# Patient Record
Sex: FEMALE | Race: WHITE | NOT HISPANIC OR LATINO | URBAN - METROPOLITAN AREA
[De-identification: names, ages, dates, MRNs, and addresses within clinical notes are randomized per-mention and may not be internally consistent; named-entity substitution may affect disease eponyms.]

---

## 2018-07-23 ENCOUNTER — AMBULATORY - HEALTHEAST (OUTPATIENT)
Dept: GERIATRICS | Facility: CLINIC | Age: 68
End: 2018-07-23

## 2018-07-23 ENCOUNTER — OFFICE VISIT - HEALTHEAST (OUTPATIENT)
Dept: GERIATRICS | Facility: CLINIC | Age: 68
End: 2018-07-23

## 2018-07-23 DIAGNOSIS — I73.9 PERIPHERAL VASCULAR DISEASE (H): ICD-10-CM

## 2018-07-23 DIAGNOSIS — F32.A DEPRESSION: ICD-10-CM

## 2018-07-23 DIAGNOSIS — E11.49 TYPE 2 DIABETES MELLITUS WITH NEUROLOGIC COMPLICATION (H): ICD-10-CM

## 2018-07-23 DIAGNOSIS — I89.0 LYMPHEDEMA: ICD-10-CM

## 2018-07-23 DIAGNOSIS — F43.10 POSTTRAUMATIC STRESS DISORDER: ICD-10-CM

## 2018-07-23 DIAGNOSIS — S98.221D: ICD-10-CM

## 2018-07-23 DIAGNOSIS — F17.200 TOBACCO DEPENDENCE: ICD-10-CM

## 2018-07-23 DIAGNOSIS — E66.01 MORBID OBESITY (H): ICD-10-CM

## 2018-07-23 DIAGNOSIS — Z98.84 HISTORY OF ROUX-EN-Y GASTRIC BYPASS: ICD-10-CM

## 2018-07-23 RX ORDER — OXYCODONE HYDROCHLORIDE 5 MG/1
5 TABLET ORAL EVERY 4 HOURS PRN
Status: SHIPPED | COMMUNITY
Start: 2018-07-23

## 2018-07-23 ASSESSMENT — MIFFLIN-ST. JEOR: SCORE: 1694.36

## 2018-07-24 ENCOUNTER — AMBULATORY - HEALTHEAST (OUTPATIENT)
Dept: ADMINISTRATIVE | Facility: CLINIC | Age: 68
End: 2018-07-24

## 2018-07-24 ENCOUNTER — RECORDS - HEALTHEAST (OUTPATIENT)
Dept: LAB | Facility: CLINIC | Age: 68
End: 2018-07-24

## 2018-07-24 ENCOUNTER — OFFICE VISIT - HEALTHEAST (OUTPATIENT)
Dept: GERIATRICS | Facility: CLINIC | Age: 68
End: 2018-07-24

## 2018-07-24 DIAGNOSIS — S98.131A AMPUTATED TOE, RIGHT (H): ICD-10-CM

## 2018-07-24 DIAGNOSIS — I10 HYPERTENSION: ICD-10-CM

## 2018-07-24 DIAGNOSIS — I82.409 DVT (DEEP VENOUS THROMBOSIS) (H): ICD-10-CM

## 2018-07-24 DIAGNOSIS — R53.81 PHYSICAL DEBILITY: ICD-10-CM

## 2018-07-24 LAB
BASOPHILS # BLD AUTO: 0 THOU/UL (ref 0–0.2)
BASOPHILS NFR BLD AUTO: 0 % (ref 0–2)
EOSINOPHIL # BLD AUTO: 0.2 THOU/UL (ref 0–0.4)
EOSINOPHIL NFR BLD AUTO: 2 % (ref 0–6)
ERYTHROCYTE [DISTWIDTH] IN BLOOD BY AUTOMATED COUNT: 14.1 % (ref 11–14.5)
HCT VFR BLD AUTO: 37.4 % (ref 35–47)
HGB BLD-MCNC: 11.9 G/DL (ref 12–16)
LYMPHOCYTES # BLD AUTO: 2.9 THOU/UL (ref 0.8–4.4)
LYMPHOCYTES NFR BLD AUTO: 26 % (ref 20–40)
MCH RBC QN AUTO: 31.2 PG (ref 27–34)
MCHC RBC AUTO-ENTMCNC: 31.8 G/DL (ref 32–36)
MCV RBC AUTO: 98 FL (ref 80–100)
MONOCYTES # BLD AUTO: 0.9 THOU/UL (ref 0–0.9)
MONOCYTES NFR BLD AUTO: 8 % (ref 2–10)
NEUTROPHILS # BLD AUTO: 6.8 THOU/UL (ref 2–7.7)
NEUTROPHILS NFR BLD AUTO: 63 % (ref 50–70)
PLATELET # BLD AUTO: 305 THOU/UL (ref 140–440)
PMV BLD AUTO: 10.8 FL (ref 8.5–12.5)
RBC # BLD AUTO: 3.81 MILL/UL (ref 3.8–5.4)
WBC: 10.9 THOU/UL (ref 4–11)

## 2018-07-24 RX ORDER — CLOPIDOGREL BISULFATE 75 MG/1
75 TABLET ORAL DAILY
Status: SHIPPED | COMMUNITY
Start: 2018-07-24

## 2018-07-24 RX ORDER — GABAPENTIN 300 MG/1
300 CAPSULE ORAL
Status: SHIPPED | COMMUNITY
Start: 2018-07-24

## 2018-07-24 RX ORDER — INSULIN GLARGINE 100 [IU]/ML
50 INJECTION, SOLUTION SUBCUTANEOUS AT BEDTIME
Status: SHIPPED | COMMUNITY
Start: 2018-07-24

## 2018-07-24 RX ORDER — GABAPENTIN 800 MG/1
800 TABLET ORAL 2 TIMES DAILY
Status: SHIPPED | COMMUNITY
Start: 2018-07-24

## 2018-07-24 RX ORDER — NYSTATIN 100000/G
1 POWDER (GRAM) TOPICAL 2 TIMES DAILY
Status: SHIPPED | COMMUNITY
Start: 2018-07-24

## 2018-07-24 RX ORDER — TIZANIDINE 2 MG/1
2-4 TABLET ORAL DAILY PRN
Status: SHIPPED | COMMUNITY
Start: 2018-07-24

## 2018-07-27 ENCOUNTER — OFFICE VISIT - HEALTHEAST (OUTPATIENT)
Dept: GERIATRICS | Facility: CLINIC | Age: 68
End: 2018-07-27

## 2018-07-27 DIAGNOSIS — S98.131A AMPUTATED TOE, RIGHT (H): ICD-10-CM

## 2018-07-27 DIAGNOSIS — E11.9 DIABETES MELLITUS (H): ICD-10-CM

## 2018-07-27 DIAGNOSIS — I10 HYPERTENSION: ICD-10-CM

## 2018-07-27 DIAGNOSIS — R53.81 PHYSICAL DEBILITY: ICD-10-CM

## 2018-07-31 ENCOUNTER — OFFICE VISIT - HEALTHEAST (OUTPATIENT)
Dept: GERIATRICS | Facility: CLINIC | Age: 68
End: 2018-07-31

## 2018-07-31 DIAGNOSIS — I73.9 PVD (PERIPHERAL VASCULAR DISEASE) (H): ICD-10-CM

## 2018-07-31 DIAGNOSIS — I82.409 DVT (DEEP VENOUS THROMBOSIS) (H): ICD-10-CM

## 2018-07-31 DIAGNOSIS — S98.131A AMPUTATED TOE, RIGHT (H): ICD-10-CM

## 2018-07-31 DIAGNOSIS — I10 HYPERTENSION: ICD-10-CM

## 2018-08-03 ENCOUNTER — OFFICE VISIT - HEALTHEAST (OUTPATIENT)
Dept: GERIATRICS | Facility: CLINIC | Age: 68
End: 2018-08-03

## 2018-08-03 DIAGNOSIS — E11.9 DIABETES MELLITUS (H): ICD-10-CM

## 2018-08-03 DIAGNOSIS — I73.9 PVD (PERIPHERAL VASCULAR DISEASE) (H): ICD-10-CM

## 2018-08-03 DIAGNOSIS — I82.409 DVT (DEEP VENOUS THROMBOSIS) (H): ICD-10-CM

## 2018-08-03 DIAGNOSIS — S98.131A AMPUTATED TOE, RIGHT (H): ICD-10-CM

## 2018-08-07 ENCOUNTER — OFFICE VISIT - HEALTHEAST (OUTPATIENT)
Dept: GERIATRICS | Facility: CLINIC | Age: 68
End: 2018-08-07

## 2018-08-07 DIAGNOSIS — I73.9 PVD (PERIPHERAL VASCULAR DISEASE) (H): ICD-10-CM

## 2018-08-07 DIAGNOSIS — S98.131A AMPUTATED TOE, RIGHT (H): ICD-10-CM

## 2018-08-07 DIAGNOSIS — I10 HYPERTENSION: ICD-10-CM

## 2018-08-07 DIAGNOSIS — E11.9 DIABETES MELLITUS (H): ICD-10-CM

## 2018-08-08 ENCOUNTER — AMBULATORY - HEALTHEAST (OUTPATIENT)
Dept: GERIATRICS | Facility: CLINIC | Age: 68
End: 2018-08-08

## 2021-06-01 VITALS — HEIGHT: 60 IN | WEIGHT: 274 LBS | BODY MASS INDEX: 53.79 KG/M2

## 2021-06-16 PROBLEM — I73.9 PVD (PERIPHERAL VASCULAR DISEASE) (H): Status: ACTIVE | Noted: 2018-07-31

## 2021-06-16 PROBLEM — E11.9 DIABETES MELLITUS (H): Status: ACTIVE | Noted: 2018-07-27

## 2021-06-16 PROBLEM — S98.131A: Status: ACTIVE | Noted: 2018-07-24

## 2021-06-16 PROBLEM — I82.409 DVT (DEEP VENOUS THROMBOSIS) (H): Status: ACTIVE | Noted: 2018-07-24

## 2021-06-16 PROBLEM — I10 HYPERTENSION: Status: ACTIVE | Noted: 2018-07-24
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
## 2021-06-19 NOTE — PROGRESS NOTES
[facility]    Facility:   [facility] [[id number]]   Code Status: FULL CODE  PCP: Gretchen Burgess MD   Phone: [phone]   Fax: [phone]      CHIEF COMPLAINT/REASON FOR VISIT:  Chief Complaint   Patient presents with     Discharge Summary       HISTORY COURSE:  Deana Frazier is a 67 y.o. female with a history of multiple risks factors for severe ischemic peripheral vascular disease including diabetes morbid obesity long-standing tobacco habituation.  The patient had an angioplasty in 2017 and an amputation of her fifth right toe.  On July 5, 2018 she had an ultrasound-guided selective right lower extremity angiogram and a superselective right lower extremity angiogram with atherectomy of the mid and distal right superficial femoral artery and placement of a drug-coated balloon angioplasty of the distal and mid superficial femoral artery.  Prior to that she had had a number of weeks of treatment for lymphedema and had developed some ulcers of her toes.  Unfortunately 2 days prior to presenting in the hospital she developed blisters and a gray appearance of her third and fourth toes.  They also began weeping.  Arterial Dopplers done in the emergency department these showed severe deterioration of her ankle-brachial indexes in moderate to severe ischemic disease.  She was then started on clindamycin in the emergency room and both vascular surgery and podiatry were involved.     Hospital course the patient did end up getting surgical amputation of her third and fourth digits on July 19 with podiatry.   She has been able to participate with the rehabilitation services.  She does have a cam boot.  She does have a recheck visit with vascular today.  Her last visit was last week checking her wound they did remove the sutures there is a gapping at the incision around the third toe area and there putting a alginate gauze in that area and changing every 3 days.  Her pain  seems to be pretty well managed currently being treated with gabapentin but no as needed also has oxycodone as needed usually 2 or 3 doses per day and occasionally does take a tight tizanidine.  Her blood sugars pretty much less than 180.  She has been normotensive and afebrile and also on room air.  Review of Systems  Currently she denies chills and fever coughing wheezing chest pain dizziness or vertigo nausea vomiting diarrhea dysuria headache stiff neck swollen glands.  History of colon cancer anemia amputation right foot toes 3 through 5 angioplasty with stenting hypertension diabetes hyperlipidemia neuropathy GERD and depression.  Vitals:    08/07/18 0833   BP: 115/72   Pulse: 72   Resp: 18   Temp: 97.1  F (36.2  C)   SpO2: 95%       Physical Exam     Constitutional: She is oriented to person, place, and time. She appears well-nourished. No distress.   HENT:    Cardiovascular: Normal rate, regular rhythm and normal heart sounds.    Chronic lymphedema.  Recent right lower extremity acute DVT   Pulmonary/Chest: Breath sounds normal.    Musculoskeletal:   Generalized debility and deconditioning.  Chronic pain.  Recent amputation right foot toes 3 through 5 .  Sutures in place.  No secondary infection.  Lymphadenopathy:     She has no cervical adenopathy.   Neurological: She is oriented to person, place, and time.   Skin: Right foot plantar third toe alginate wick  Psychiatric: Her behavior is normal.      Lab Results   Component Value Date    WBC 10.9 07/24/2018    HGB 11.9 (L) 07/24/2018    HCT 37.4 07/24/2018    MCV 98 07/24/2018     07/24/2018     Results for orders placed or performed during the hospital encounter of 10/20/14   Basic Metabolic Panel   Result Value Ref Range    Sodium 139 136 - 145 mmol/L    Potassium 3.9 3.5 - 5.0 mmol/L    Chloride 101 98 - 107 mmol/L    CO2 27 22 - 31 mmol/L    Anion Gap, Calculation 11 5 - 18 mmol/L    Glucose 148 (H) 70 - 125 mg/dL    Calcium 9.6 8.5 - 10.5 mg/dL     BUN 8 8 - 22 mg/dL    Creatinine 0.80 0.60 - 1.10 mg/dL    GFR MDRD Af Amer >60 >60 mL/min/1.73m2    GFR MDRD Non Af Amer >60 >60 mL/min/1.73m2     No results found for: HGBA1C  MEDICATION LIST:  Current Outpatient Prescriptions   Medication Sig     aspirin 325 MG EC tablet Take 325 mg by mouth daily.     atenolol (TENORMIN) 50 MG tablet Take 100 mg by mouth daily.      atorvastatin (LIPITOR) 80 MG tablet Take 80 mg by mouth at bedtime.      clopidogrel (PLAVIX) 75 mg tablet Take 75 mg by mouth daily.     fluocinonide (LIDEX) 0.05 % cream Apply 1 application topically 2 (two) times a day as needed.      gabapentin (NEURONTIN) 300 MG capsule Take 300 mg by mouth at bedtime as needed.     gabapentin (NEURONTIN) 800 MG tablet Take 800 mg by mouth 2 (two) times a day.     insulin aspart U-100 (NOVOLOG) 100 unit/mL injection Inject 10 Units under the skin 3 (three) times a day with meals. And per sliding scale three times a day with meals; And per sliding scale at bedtime     insulin glargine (LANTUS) 100 unit/mL injection Inject 50 Units under the skin at bedtime.     lisinopril (PRINIVIL,ZESTRIL) 20 MG tablet Take 20 mg by mouth daily.     metFORMIN (GLUCOPHAGE) 1000 MG tablet Take 1,000 mg by mouth 2 (two) times a day with meals.     multivitamin (MULTIPLE VITAMIN ORAL) Take 1 tablet by mouth daily.     nystatin (MYCOSTATIN) powder Apply 1 application topically 2 (two) times a day.     omeprazole (PRILOSEC) 20 MG capsule Take 20 mg by mouth daily.     oxyCODONE (ROXICODONE) 5 MG immediate release tablet Take 5 mg by mouth every 4 (four) hours as needed for pain.     sertraline (ZOLOFT) 100 MG tablet Take 100 mg by mouth daily.     tiZANidine (ZANAFLEX) 2 MG tablet Take 2-4 mg by mouth daily as needed.     traZODone (DESYREL) 100 MG tablet Take 100 mg by mouth at bedtime.        DISCHARGE DIAGNOSIS:    ICD-10-CM    1. Amputated toe, right (H) Z89.421    2. Diabetes mellitus (H) E11.9    3. Hypertension I10    4. PVD  (peripheral vascular disease) (H) I73.9        MEDICAL EQUIPMENT NEEDS:  none    DISCHARGE PLAN/FACE TO FACE:  I certify that services are/were furnished while this patient was under the care of a physician and that a physician or an allowed non-physician practitioner (NPP), had a face-to-face encounter that meets the physician face-to-face encounter requirements. The encounter was in whole, or in part, related to the primary reason for home health. The patient is confined to his/her home and needs intermittent skilled nursing, physical therapy, speech-language pathology, or the continued need for occupational therapy. A plan of care has been established by a physician and is periodically reviewed by a physician.  Date of Face-to-Face Encounter: August 7, 2018    I certify that, based on my findings, the following services are medically necessary home health services: She will be discharging to home with current medications and narcotics on Tuesday, August 7, 2018 with [facility] for physical and occupational therapy and nursing for the wound care.    My clinical findings support the need for the above skilled services because: (Please write a brief narrative summary that describes what the RN, PT, SLP, or other services will be doing in the home. A list of diagnoses in this section does not meet the CMS requirements.)  She will be requiring the skilled services secondary to home safety transfers and wound care    This patient is homebound because: (Please write a brief narrative summary describing the functional limitations as to why this patient is homebound and specifically what makes this patient homebound.)  Secondary to her multiple chronic medical conditions including wound care transfers home safety and assistance with ADLs    The patient is, or has been, under my care and I have initiated the establishment of the plan of care. This patient will be followed by a physician who will periodically  review the plan of care.    Schedule follow up visit with primary care provider within 7 days to reestablish care.  She does have a follow-up with the vascular clinic August 7, 2018.  She will also follow-up with her primary care doctor regarding medication management and any future laboratory studies.  And also would benefit her to follow-up with diabetic education to go over her medication as well as her current regiment even though currently she has been quite stable on the transitional care unit.  She does not have any other particular questions upon going home.  We will also discontinue the Lovenox and we will also place her on an aspirin a day for DVT prophylaxis.    Discharge coordination care greater than 30 minutes  Electronically signed by: Michael Duane Johnson, CNP
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
